# Patient Record
Sex: MALE | URBAN - METROPOLITAN AREA
[De-identification: names, ages, dates, MRNs, and addresses within clinical notes are randomized per-mention and may not be internally consistent; named-entity substitution may affect disease eponyms.]

---

## 2022-05-07 ENCOUNTER — APPOINTMENT (OUTPATIENT)
Dept: RADIOLOGY | Facility: MEDICAL CENTER | Age: 37
End: 2022-05-07
Attending: EMERGENCY MEDICINE
Payer: COMMERCIAL

## 2022-05-07 ENCOUNTER — HOSPITAL ENCOUNTER (EMERGENCY)
Facility: MEDICAL CENTER | Age: 37
End: 2022-05-07
Attending: EMERGENCY MEDICINE
Payer: COMMERCIAL

## 2022-05-07 VITALS
SYSTOLIC BLOOD PRESSURE: 102 MMHG | OXYGEN SATURATION: 96 % | HEART RATE: 49 BPM | WEIGHT: 175 LBS | BODY MASS INDEX: 21.76 KG/M2 | RESPIRATION RATE: 18 BRPM | HEIGHT: 75 IN | TEMPERATURE: 97.8 F | DIASTOLIC BLOOD PRESSURE: 61 MMHG

## 2022-05-07 DIAGNOSIS — M54.50 LUMBAR BACK PAIN: ICD-10-CM

## 2022-05-07 PROCEDURE — 700111 HCHG RX REV CODE 636 W/ 250 OVERRIDE (IP): Performed by: EMERGENCY MEDICINE

## 2022-05-07 PROCEDURE — 72100 X-RAY EXAM L-S SPINE 2/3 VWS: CPT

## 2022-05-07 PROCEDURE — 96374 THER/PROPH/DIAG INJ IV PUSH: CPT

## 2022-05-07 PROCEDURE — 99284 EMERGENCY DEPT VISIT MOD MDM: CPT

## 2022-05-07 RX ORDER — IBUPROFEN 600 MG/1
600 TABLET ORAL EVERY 8 HOURS PRN
Qty: 30 TABLET | Refills: 0 | Status: SHIPPED | OUTPATIENT
Start: 2022-05-07

## 2022-05-07 RX ORDER — KETOROLAC TROMETHAMINE 30 MG/ML
30 INJECTION, SOLUTION INTRAMUSCULAR; INTRAVENOUS ONCE
Status: COMPLETED | OUTPATIENT
Start: 2022-05-07 | End: 2022-05-07

## 2022-05-07 RX ORDER — CYCLOBENZAPRINE HCL 5 MG
5-10 TABLET ORAL 3 TIMES DAILY PRN
Qty: 20 TABLET | Refills: 0 | Status: SHIPPED | OUTPATIENT
Start: 2022-05-07

## 2022-05-07 RX ADMIN — KETOROLAC TROMETHAMINE 30 MG: 30 INJECTION, SOLUTION INTRAMUSCULAR at 10:03

## 2022-05-07 NOTE — ED NOTES
Discharge orders received, IV and monitor discontinued, instructions and education given, follow-up discussed, prescriptions sent to pharmacy, work note provided, pt verbalized understanding.

## 2022-05-07 NOTE — ED PROVIDER NOTES
"ED Provider Note      CHIEF COMPLAINT  Chief Complaint   Patient presents with   • Back Pain     Pt states he hurt his back at work ~2 weeks ago lifting heavy objects, states the pain had gone away but woke today with worsening pain in his L lower back. Describes the pain as \"sharp/cramping\"       HPI  Mark Graves is a 36 y.o. male who presents to the emergency department with back pain.  Patient thinks he might of injured his back at work about 2 weeks ago.  At least his pain started about 2 weeks ago.  It is worse with lifting and bending.  Is located in his mid lumbar region.  Worse on the left than on the right.  Cramping sharp character.  Does not radiate into the extremities.  Has not had any trauma or fall.  No abdominal pain.  No dysuria.  Never had back pain like this before.  He would like an x-ray.        REVIEW OF SYSTEMS  Denies any weakness in the lower extremities. No bowel or bladder incontinence or saddle anesthesia. No fevers or chills. No injection drug use or IV drug abuse. No abdominal pain, nausea or vomiting. No dysuria, hematuria or flank pain.    PAST MEDICAL HISTORY  History reviewed. No pertinent past medical history.    FAMILY HISTORY  History reviewed. No pertinent family history.    SOCIAL HISTORY  Social History     Tobacco Use   • Smoking status: Never Smoker   • Smokeless tobacco: Never Used   Vaping Use   • Vaping Use: Never used   Substance Use Topics   • Alcohol use: Yes     Comment: occ   • Drug use: Never       SURGICAL HISTORY  History reviewed. No pertinent surgical history.    CURRENT MEDICATIONS  Home Medications     Reviewed by Lexy Inman R.N. (Registered Nurse) on 05/07/22 at 0901  Med List Status: Complete   Medication Last Dose Status        Patient Rico Taking any Medications                       ALLERGIES  No Known Allergies      PHYSICAL EXAM  VITAL SIGNS: /68   Pulse 63   Temp 36.7 °C (98 °F) (Temporal)   Resp 18   Ht 1.905 m (6' 3\")   Wt 79.4 kg " (175 lb)   SpO2 97%   BMI 21.87 kg/m²   Constitutional: Well developed, Well nourished, No acute distress, Non-toxic appearance. Complaining of pain  Neck: Grossly normal range of motion  Cardiovascular: Normal heart rate   Thorax & Lungs: No respiratory distress  Abdomen: Bowel sounds normal, soft, non-distended, nontender, no masses.  Skin: Warm, Dry, No rash.   Back: Diffuse lumbar tenderness, no step-off or deformity  Extremities: No clubbing, cyanosis, edema, no Homans or cords   Neurologic: Grossly normal cranial nerves, 5 out of 5 EHL, FHL, gastrocnemius, tibialis anterior. 2+ DTRs at the patellas bilaterally. Normal gait . Normal sensory throughout.      RADIOLOGY/PROCEDURES  DX-LUMBAR SPINE-2 OR 3 VIEWS   Final Result      Unremarkable complete lumbar spine series.         Imaging is interpreted by radiologist       COURSE & MEDICAL DECISION MAKING    Patient presents with low back pain.  He does not recall any specific event or injury.  Thinks it may be occurred at work, but is not sure.  Does not want to file as occupational health injury.  Patient had been given fentanyl by paramedics.  I gave him Toradol in the ER with improvement.  X-ray was obtained and was negative.  At this point I suspect lumbar strain.  He does not have any radicular symptoms.  No sign of spinal cord impingement syndrome or infectious process.  I will start him on scheduled NSAIDs for 1 week with food.  Given GI precautions.  Also given a prescription for Flexeril.  Advise follow-up with primary provider within 1 week and given the number for the [facility] clinic.  He is to return to the ER for weakness in the extremities, bowel or bladder dysfunction, saddle anesthesia, fevers or concern.      FINAL IMPRESSION  1.  Acute lumbar strain        This dictation was created using voice recognition software. The accuracy of the dictation is limited to the abilities of the software.  The nursing notes were reviewed and certain aspects of  this information were incorporated into this note.    Electronically signed by: Smith Carranza M.D., 5/7/2022 9:32 AM

## 2022-05-07 NOTE — ED TRIAGE NOTES
"Chief Complaint   Patient presents with   • Back Pain     Pt states he hurt his back at work ~2 weeks ago lifting heavy objects, states the pain had gone away but woke today with worsening pain in his L lower back. Describes the pain as \"sharp/cramping\"     /68   Pulse 63   Temp 36.7 °C (98 °F) (Temporal)   Resp 18   Ht 1.905 m (6' 3\")   Wt 79.4 kg (175 lb)   SpO2 97%   BMI 21.87 kg/m²     Pt brought in by [facility] from home to [facility] [address], mask in place. Pt in a gown and on monitor. Chart flagged for ERP to see.    PIV established, 4 mg zofran, 100 mcg fentanyl pta.   "